# Patient Record
(demographics unavailable — no encounter records)

---

## 2024-12-31 NOTE — HISTORY OF PRESENT ILLNESS
[Not Applicable] : Not applicable [FreeTextEntry1] : Patient is a 9y/o female, domiciled with mother, father, and 6y/o brother, currently enrolled at [employer] (in enrichment program for bright students), 5th grade general education, has been in outpatient therapy with different providers for the past 2 years due to anxiety, currently in outpatient treatment with new therapist since August/September of 2024, no self-injury or suicide attempts, no aggression/violence, no substance use, no legal issues, no CPS involvement, no trauma/abuse, no PMH, presenting today with mother who was self-referred for severe anxiety.  [facility] DO met with patient who was polite and forthcoming. She reports experiencing distressing anxiety, describing it as unpredictable. While she finds her current therapist helpful, she expresses frustration with some of the therapist's recommendations to her parents. She notes a positive experience with therapy overall. A recent visit to a puppy store, where she was upset by not getting a puppy, triggered feelings of being left out because her friends have new pets. Patient reports her desire for a puppy stems from past experiences with panic attacks, during which she feared appendicitis due to abdominal pain, later attributed to nerves and constipation. Patient reports having a puppy might help her cope with her anxiety. She reports being free from panic attacks for a little over a month. She described her first panic attack, triggered by concern for a friend's well-being, which resulted in shaking, vomiting, and stiff hands. She experiences anxiety related to health concerns, particularly when others exhibit symptoms she fears she might also develop. She experiences ruminating thoughts, though they have lessened, focusing on fears of illness and potential hospital visits. Patient reports she manages these thoughts by focusing on music from plays, reflecting her passion for acting. She reports doing well academically, enjoying writing, reading graphic novels, and writing movie scripts. She maintains focus in class, though occasional distractions arise from her thoughts. She reports a positive relationship with her family, except for some conflict with her brother, and feels safe at home. Her appetite is poor, with a limited diet excluding meat (due to her refusal to eat animals), most vegetables, and many fruits. She currently eats only white meat and tends to overeat preferred foods. Her goals include improving her acting skills, reducing conflict with her brother, and persevering with her writing projects. She denies suicidal ideation, though she reports a past desire to be a different person. Denies self-injurious behaviors.   [facility] DO obtained collateral information from patient's mother. She reports two years ago, patient experienced significant anticipatory anxiety and panic attacks related to attending [employer] camp, ultimately leading to her not attending. Last year, while generally managing anxiety related to illness and medical issues better, she experienced sleep disruptions, including night terrors and screaming, for several weeks after returning from a two-week sleepaway camp with her best friend. This required someone to sleep with her throughout the summer. While her sleep has returned to normal, her anxiety has increased this year, extending to school and social situations, with unknown triggers and no reported trauma history. She exhibits a lack of focus, and struggles to self-regulate. Recent panic attacks have included physical stomach symptoms and a fear of something being wrong with her, one incident requiring consideration of a hospital visit. A month ago, constipation led to a painful episode at [facility] involving screaming and a sleepless night. Despite initial excitement about a school enrichment program (LEAP), she struggled academically at the beginning of the year, particularly in math, and experienced significant anxiety about the program, wanting to discontinue it. Her performance has since improved, though her teacher describes her as distracted, unfocused, prone to mistakes, and frequently draws and illustrates in class. At home, she exhibits challenging behaviors, including screaming, crying, and hiding under the table when asked to do something. Socially, she appears to be regressing, experiencing difficulties with friends. She is described as a "deep feeling kid" with a "big personality." While she enjoys theater and participates in state-level competitions, she has recently exhibited pre-performance anxiety and reluctance to attend rehearsals, which is new according to patient's mom. Mom reports she is an extremely picky eater with food aversions, limiting her intake to approximately ten foods and restricting restaurant choices. Mom reports during panic attacks, she hyperventilates, rocks back and forth, and on one occasion, she hit herself against the walls. They are satisfied with her current therapist and wish to continue services. Suicidal ideation and self-injurious behaviors are denied. Mom denies acute safety concerns.  [FreeTextEntry2] : See HPI [FreeTextEntry3] : None

## 2024-12-31 NOTE — RISK ASSESSMENT
[No] : No [No known suicide factors] : No known suicide factors [Triggering events leading to humiliation, shame, and/or despair] : triggering events leading to humiliation, shame, and/or despair (e.g. loss of relationship, financial or health status) (real or anticipated) [Identifies reasons for living] : identifies reasons for living [Supportive social network of family or friends] : supportive social network of family or friends [Fear of death/actual act of killing self] : fear of death or the actual act of killing self [Engaged in work or school] : engaged in work or school [None in the patient's lifetime] : None in the patient's lifetime [None Known] : none known [No known risk factors] : No known risk factors [Residential stability] : residential stability [Engagement in treatment] : engagement in treatment [Yes] : yes [de-identified] : Patient does not have access to lethal means/weapons

## 2024-12-31 NOTE — HISTORY OF PRESENT ILLNESS
[Not Applicable] : Not applicable [FreeTextEntry1] : Patient is a 11y/o female, domiciled with mother, father, and 6y/o brother, currently enrolled at [employer] (in enrichment program for bright students), 5th grade general education, has been in outpatient therapy with different providers for the past 2 years due to anxiety, currently in outpatient treatment with new therapist since August/September of 2024, no self-injury or suicide attempts, no aggression/violence, no substance use, no legal issues, no CPS involvement, no trauma/abuse, no PMH, presenting today with mother who was self-referred for severe anxiety.  [facility] DO met with patient who was polite and forthcoming. She reports experiencing distressing anxiety, describing it as unpredictable. While she finds her current therapist helpful, she expresses frustration with some of the therapist's recommendations to her parents. She notes a positive experience with therapy overall. A recent visit to a puppy store, where she was upset by not getting a puppy, triggered feelings of being left out because her friends have new pets. Patient reports her desire for a puppy stems from past experiences with panic attacks, during which she feared appendicitis due to abdominal pain, later attributed to nerves and constipation. Patient reports having a puppy might help her cope with her anxiety. She reports being free from panic attacks for a little over a month. She described her first panic attack, triggered by concern for a friend's well-being, which resulted in shaking, vomiting, and stiff hands. She experiences anxiety related to health concerns, particularly when others exhibit symptoms she fears she might also develop. She experiences ruminating thoughts, though they have lessened, focusing on fears of illness and potential hospital visits. Patient reports she manages these thoughts by focusing on music from plays, reflecting her passion for acting. She reports doing well academically, enjoying writing, reading graphic novels, and writing movie scripts. She maintains focus in class, though occasional distractions arise from her thoughts. She reports a positive relationship with her family, except for some conflict with her brother, and feels safe at home. Her appetite is poor, with a limited diet excluding meat (due to her refusal to eat animals), most vegetables, and many fruits. She currently eats only white meat and tends to overeat preferred foods. Her goals include improving her acting skills, reducing conflict with her brother, and persevering with her writing projects. She denies suicidal ideation, though she reports a past desire to be a different person. Denies self-injurious behaviors.   [facility] DO obtained collateral information from patient's mother. She reports two years ago, patient experienced significant anticipatory anxiety and panic attacks related to attending [employer] camp, ultimately leading to her not attending. Last year, while generally managing anxiety related to illness and medical issues better, she experienced sleep disruptions, including night terrors and screaming, for several weeks after returning from a two-week sleepaway camp with her best friend. This required someone to sleep with her throughout the summer. While her sleep has returned to normal, her anxiety has increased this year, extending to school and social situations, with unknown triggers and no reported trauma history. She exhibits a lack of focus, and struggles to self-regulate. Recent panic attacks have included physical stomach symptoms and a fear of something being wrong with her, one incident requiring consideration of a hospital visit. A month ago, constipation led to a painful episode at [facility] involving screaming and a sleepless night. Despite initial excitement about a school enrichment program (LEAP), she struggled academically at the beginning of the year, particularly in math, and experienced significant anxiety about the program, wanting to discontinue it. Her performance has since improved, though her teacher describes her as distracted, unfocused, prone to mistakes, and frequently draws and illustrates in class. At home, she exhibits challenging behaviors, including screaming, crying, and hiding under the table when asked to do something. Socially, she appears to be regressing, experiencing difficulties with friends. She is described as a "deep feeling kid" with a "big personality." While she enjoys theater and participates in state-level competitions, she has recently exhibited pre-performance anxiety and reluctance to attend rehearsals, which is new according to patient's mom. Mom reports she is an extremely picky eater with food aversions, limiting her intake to approximately ten foods and restricting restaurant choices. Mom reports during panic attacks, she hyperventilates, rocks back and forth, and on one occasion, she hit herself against the walls. They are satisfied with her current therapist and wish to continue services. Suicidal ideation and self-injurious behaviors are denied. Mom denies acute safety concerns.  [FreeTextEntry2] : See HPI [FreeTextEntry3] : None

## 2024-12-31 NOTE — HISTORY OF PRESENT ILLNESS
[Not Applicable] : Not applicable [FreeTextEntry1] : Patient is a 9y/o female, domiciled with mother, father, and 6y/o brother, currently enrolled at [employer] (in enrichment program for bright students), 5th grade general education, has been in outpatient therapy with different providers for the past 2 years due to anxiety, currently in outpatient treatment with new therapist since August/September of 2024, no self-injury or suicide attempts, no aggression/violence, no substance use, no legal issues, no CPS involvement, no trauma/abuse, no PMH, presenting today with mother who was self-referred for severe anxiety.  [facility] DO met with patient who was polite and forthcoming. She reports experiencing distressing anxiety, describing it as unpredictable. While she finds her current therapist helpful, she expresses frustration with some of the therapist's recommendations to her parents. She notes a positive experience with therapy overall. A recent visit to a puppy store, where she was upset by not getting a puppy, triggered feelings of being left out because her friends have new pets. Patient reports her desire for a puppy stems from past experiences with panic attacks, during which she feared appendicitis due to abdominal pain, later attributed to nerves and constipation. Patient reports having a puppy might help her cope with her anxiety. She reports being free from panic attacks for a little over a month. She described her first panic attack, triggered by concern for a friend's well-being, which resulted in shaking, vomiting, and stiff hands. She experiences anxiety related to health concerns, particularly when others exhibit symptoms she fears she might also develop. She experiences ruminating thoughts, though they have lessened, focusing on fears of illness and potential hospital visits. Patient reports she manages these thoughts by focusing on music from plays, reflecting her passion for acting. She reports doing well academically, enjoying writing, reading graphic novels, and writing movie scripts. She maintains focus in class, though occasional distractions arise from her thoughts. She reports a positive relationship with her family, except for some conflict with her brother, and feels safe at home. Her appetite is poor, with a limited diet excluding meat (due to her refusal to eat animals), most vegetables, and many fruits. She currently eats only white meat and tends to overeat preferred foods. Her goals include improving her acting skills, reducing conflict with her brother, and persevering with her writing projects. She denies suicidal ideation, though she reports a past desire to be a different person. Denies self-injurious behaviors.   [facility] DO obtained collateral information from patient's mother. She reports two years ago, patient experienced significant anticipatory anxiety and panic attacks related to attending [employer] camp, ultimately leading to her not attending. Last year, while generally managing anxiety related to illness and medical issues better, she experienced sleep disruptions, including night terrors and screaming, for several weeks after returning from a two-week sleepaway camp with her best friend. This required someone to sleep with her throughout the summer. While her sleep has returned to normal, her anxiety has increased this year, extending to school and social situations, with unknown triggers and no reported trauma history. She exhibits a lack of focus, and struggles to self-regulate. Recent panic attacks have included physical stomach symptoms and a fear of something being wrong with her, one incident requiring consideration of a hospital visit. A month ago, constipation led to a painful episode at [facility] involving screaming and a sleepless night. Despite initial excitement about a school enrichment program (LEAP), she struggled academically at the beginning of the year, particularly in math, and experienced significant anxiety about the program, wanting to discontinue it. Her performance has since improved, though her teacher describes her as distracted, unfocused, prone to mistakes, and frequently draws and illustrates in class. At home, she exhibits challenging behaviors, including screaming, crying, and hiding under the table when asked to do something. Socially, she appears to be regressing, experiencing difficulties with friends. She is described as a "deep feeling kid" with a "big personality." While she enjoys theater and participates in state-level competitions, she has recently exhibited pre-performance anxiety and reluctance to attend rehearsals, which is new according to patient's mom. Mom reports she is an extremely picky eater with food aversions, limiting her intake to approximately ten foods and restricting restaurant choices. Mom reports during panic attacks, she hyperventilates, rocks back and forth, and on one occasion, she hit herself against the walls. They are satisfied with her current therapist and wish to continue services. Suicidal ideation and self-injurious behaviors are denied. Mom denies acute safety concerns.  [FreeTextEntry2] : See HPI [FreeTextEntry3] : None

## 2024-12-31 NOTE — RISK ASSESSMENT
[No] : No [No known suicide factors] : No known suicide factors [Triggering events leading to humiliation, shame, and/or despair] : triggering events leading to humiliation, shame, and/or despair (e.g. loss of relationship, financial or health status) (real or anticipated) [Identifies reasons for living] : identifies reasons for living [Supportive social network of family or friends] : supportive social network of family or friends [Fear of death/actual act of killing self] : fear of death or the actual act of killing self [Engaged in work or school] : engaged in work or school [None in the patient's lifetime] : None in the patient's lifetime [None Known] : none known [No known risk factors] : No known risk factors [Residential stability] : residential stability [Engagement in treatment] : engagement in treatment [Yes] : yes [de-identified] : Patient does not have access to lethal means/weapons

## 2024-12-31 NOTE — DISCUSSION/SUMMARY
[Low acute suicide risk] : Low acute suicide risk [No] : No [Not clinically indicated] : Safety Plan completed/updated (for individuals at risk): Not clinically indicated [FreeTextEntry1] : At present, patient has a low risk of harm to self.  Although patient has risk factors including history of anxiety, severe panic attacks, patient has significant protective factors including strong family/social support, domiciled, age, lack of prior self-harm, no suicide attempts, no substance use, no veronika, no psychosis, no CAH, no psychiatric hospitalization, current willingness to engage in treatment, future orientation with long & short term goals for the future, hopeful, help-seeking, engaged in school & activities, current denial of any SIIP or urges to self-harm, no reported hx of abuse/trauma, no aggression/violence, no access to guns/family is able to means restrict, no legal history.

## 2024-12-31 NOTE — REASON FOR VISIT
[Behavioral Health Urgent Care Assessment] : a behavioral health urgent care assessment [Patient] : patient [Self] : alone [Mother] : with mother [TextBox_17] : Severe anxiety

## 2024-12-31 NOTE — PLAN
[TextBox_9] : Patient will benefit from connecting to social skills group [TextBox_11] : Discussed potential benefit of pharmacotherapy, family notes that they can return to discuss [TextBox_13] : no acute safety concerns at this time [TextBox_26] : self-referred/did not sign consent

## 2024-12-31 NOTE — HISTORY OF PRESENT ILLNESS
[Not Applicable] : Not applicable [FreeTextEntry1] : Patient is a 11y/o female, domiciled with mother, father, and 8y/o brother, currently enrolled at [employer] (in enrichment program for bright students), 5th grade general education, has been in outpatient therapy with different providers for the past 2 years due to anxiety, currently in outpatient treatment with new therapist since August/September of 2024, no self-injury or suicide attempts, no aggression/violence, no substance use, no legal issues, no CPS involvement, no trauma/abuse, no PMH, presenting today with mother who was self-referred for severe anxiety.  [facility] DO met with patient who was polite and forthcoming. She reports experiencing distressing anxiety, describing it as unpredictable. While she finds her current therapist helpful, she expresses frustration with some of the therapist's recommendations to her parents. She notes a positive experience with therapy overall. A recent visit to a puppy store, where she was upset by not getting a puppy, triggered feelings of being left out because her friends have new pets. Patient reports her desire for a puppy stems from past experiences with panic attacks, during which she feared appendicitis due to abdominal pain, later attributed to nerves and constipation. Patient reports having a puppy might help her cope with her anxiety. She reports being free from panic attacks for a little over a month. She described her first panic attack, triggered by concern for a friend's well-being, which resulted in shaking, vomiting, and stiff hands. She experiences anxiety related to health concerns, particularly when others exhibit symptoms she fears she might also develop. She experiences ruminating thoughts, though they have lessened, focusing on fears of illness and potential hospital visits. Patient reports she manages these thoughts by focusing on music from plays, reflecting her passion for acting. She reports doing well academically, enjoying writing, reading graphic novels, and writing movie scripts. She maintains focus in class, though occasional distractions arise from her thoughts. She reports a positive relationship with her family, except for some conflict with her brother, and feels safe at home. Her appetite is poor, with a limited diet excluding meat (due to her refusal to eat animals), most vegetables, and many fruits. She currently eats only white meat and tends to overeat preferred foods. Her goals include improving her acting skills, reducing conflict with her brother, and persevering with her writing projects. She denies suicidal ideation, though she reports a past desire to be a different person. Denies self-injurious behaviors.   [facility] DO obtained collateral information from patient's mother. She reports two years ago, patient experienced significant anticipatory anxiety and panic attacks related to attending [employer] camp, ultimately leading to her not attending. Last year, while generally managing anxiety related to illness and medical issues better, she experienced sleep disruptions, including night terrors and screaming, for several weeks after returning from a two-week sleepaway camp with her best friend. This required someone to sleep with her throughout the summer. While her sleep has returned to normal, her anxiety has increased this year, extending to school and social situations, with unknown triggers and no reported trauma history. She exhibits a lack of focus, and struggles to self-regulate. Recent panic attacks have included physical stomach symptoms and a fear of something being wrong with her, one incident requiring consideration of a hospital visit. A month ago, constipation led to a painful episode at [facility] involving screaming and a sleepless night. Despite initial excitement about a school enrichment program ([employer]AP), she struggled academically at the beginning of the year, particularly in math, and experienced significant anxiety about the program, wanting to discontinue it. Her performance has since improved, though her teacher describes her as distracted, unfocused, prone to mistakes, and frequently draws and illustrates in class. At home, she exhibits challenging behaviors, including screaming, crying, and hiding under the table when asked to do something. Socially, she appears to be regressing, experiencing difficulties with friends. She is described as a "deep feeling kid" with a "big personality." While she enjoys theater and participates in state-level competitions, she has recently exhibited pre-performance anxiety and reluctance to attend rehearsals, which is new according to patient's mom. Mom reports she is an extremely picky eater with food aversions, limiting her intake to approximately ten foods and restricting restaurant choices. Mom reports during panic attacks, she hyperventilates, rocks back and forth, and on one occasion, she hit herself against the walls. They are satisfied with her current therapist and wish to continue services. Suicidal ideation and self-injurious behaviors are denied. Mom denies acute safety concerns.  [FreeTextEntry2] : See HPI [FreeTextEntry3] : None

## 2024-12-31 NOTE — RISK ASSESSMENT
[No] : No [No known suicide factors] : No known suicide factors [Triggering events leading to humiliation, shame, and/or despair] : triggering events leading to humiliation, shame, and/or despair (e.g. loss of relationship, financial or health status) (real or anticipated) [Identifies reasons for living] : identifies reasons for living [Supportive social network of family or friends] : supportive social network of family or friends [Fear of death/actual act of killing self] : fear of death or the actual act of killing self [Engaged in work or school] : engaged in work or school [None in the patient's lifetime] : None in the patient's lifetime [None Known] : none known [No known risk factors] : No known risk factors [Residential stability] : residential stability [Engagement in treatment] : engagement in treatment [Yes] : yes [de-identified] : Patient does not have access to lethal means/weapons

## 2024-12-31 NOTE — RISK ASSESSMENT
[No] : No [No known suicide factors] : No known suicide factors [Triggering events leading to humiliation, shame, and/or despair] : triggering events leading to humiliation, shame, and/or despair (e.g. loss of relationship, financial or health status) (real or anticipated) [Identifies reasons for living] : identifies reasons for living [Supportive social network of family or friends] : supportive social network of family or friends [Fear of death/actual act of killing self] : fear of death or the actual act of killing self [Engaged in work or school] : engaged in work or school [None in the patient's lifetime] : None in the patient's lifetime [None Known] : none known [No known risk factors] : No known risk factors [Residential stability] : residential stability [Engagement in treatment] : engagement in treatment [Yes] : yes [de-identified] : Patient does not have access to lethal means/weapons

## 2025-04-22 NOTE — PLAN
[Provision of National Suicide Prevention Lifeline 9-997-424-TALK (6856)] : Provision of national suicide prevention lifeline 9-961-094-talk (6118) [Education provided regarding environmental safety/ lethal means restriction] : Education provided regarding environmental safety/ lethal means restriction [Contact was Attempted] : no contact was attempted [TextBox_9] : patient referred to Peds Neuro, care coordination to follow up regarding appointment, continue w/ current therapist  [TextBox_11] : none [TextBox_13] : Patient is not an acute threat to self or others and does not warrant for an inpatient admission. Patient presents as help seeking, future oriented, motivated for outpatient therapy and was able to engage in safety planning. Patient & family are agreeable to discharge plan. Patient & patient's family instructed to go to the nearest ED or call 911 if symptoms worsen or if safety concerns arise. [TextBox_26] : school currently on break

## 2025-04-22 NOTE — HISTORY OF PRESENT ILLNESS
[Not Applicable] : Not applicable [FreeTextEntry1] : Patient is a 11y/o female, domiciled with mother, father, and 8y/o brother, currently enrolled at [employer] in enrichment program for bright/accelerated students, 5th grade general education, has been in outpatient therapy with different providers for the past 2 years due to anxiety, currently in outpatient treatment with new therapist since August/September of 2024, no self-injury or suicide attempts, no aggression/violence, no substance use, no legal issues, no CPS involvement, no trauma/abuse, no PMH, presenting today with mother who was self-referred for assessment of ADHD.  Pt seen remotely and presented calm and cooperative w/ appropriate affect, cheerful throughout the interview and fidgety at times. Reports her anxiety has improved since last visit and has been feeling pretty happy. Sometimes has "weird dreams" (scary dreams on clarification) which she then uses to make make-belief movies. Admits to getting upset sometimes when she is told "no" which results in her slamming doors or hitting her brother (when he bothers her). Denies attention or focus issues. Denied mood/psychotic/anxiety symptoms. Denied current or past SI/HI, plan or intent. Denied current urges to harm self or others. Denied current aggressive ideations. Future oriented and wants to become an actor.   [facility] met with patient's parents. The patient's parents are reporting the following the symptoms of inattention: having a short attention span and being easily distracted; making careless mistakes - for example, rushing schoolwork; appearing forgetful or losing things; being unable to stick to tasks that are tedious or time-consuming; appearing to be unable to listen to or carry out instructions, often getting bored and changing activity or task; having difficulty organizing tasks and trouble with time management. They are also reporting symptoms of hyperactivity and impulsiveness: being unable to sit still, especially in calm or quiet surroundings; fidgeting often; excessive physical movement; excessive talking; being unable to wait their turn; acting without thinking; calling out or interrupting conversations. Patient's parents report that symptoms are persistent in academic, social, and residential setting. Patient's parents reports symptoms have been present since 3-4 y.o. but have increased in severity over the last few years, increasingly significantly to the point of impacting patient's academic and social life in a negative way this past school year.  Patient's parents are reporting general anxiety symptoms from patient consisting of: persistent worrying or anxiety about a number of areas that are out of proportion to the impact of the events; over-thinking plans and solutions to all possible worst-case outcomes; perceiving situations and events as threatening, even when they aren't; difficulty handling uncertainty; indecisiveness and fear of making the wrong decision; inability to set aside or let go of a worry; inability to relax, feeling restless, and feeling on edge; and difficulty concentrating. Patient's parents report that symptoms were significant in September through November of 2024, but report decrease in intensity over the last few months.   PER 12/2024 APPT:  Patient is a 11y/o female, domiciled with mother, father, and 8y/o brother, currently enrolled [facility] [employer] (in enrichment program for bright students), 5th grade general education, has been in outpatient therapy with different providers for the past 2 years due to anxiety, currently in outpatient treatment with new therapist since August/September of 2024, no self-injury or suicide attempts, no aggression/violence, no substance use, no legal issues, no CPS involvement, no trauma/abuse, no PMH, presenting today with mother who was self-referred for severe anxiety.  [facility] DO met with patient who was polite and forthcoming. She reports experiencing distressing anxiety, describing it as unpredictable. While she finds her current therapist helpful, she expresses frustration with some of the therapist's recommendations to her parents. She notes a positive experience with therapy overall. A recent visit to a puppy store, where she was upset by not getting a puppy, triggered feelings of being left out because her friends have new pets. Patient reports her desire for a puppy stems from past experiences with panic attacks, during which she feared appendicitis due to abdominal pain, later attributed to nerves and constipation. Patient reports having a puppy might help her cope with her anxiety. She reports being free from panic attacks for a little over a month. She described her first panic attack, triggered by concern for a friend's well-being, which resulted in shaking, vomiting, and stiff hands. She experiences anxiety related to health concerns, particularly when others exhibit symptoms she fears she might also develop. She experiences ruminating thoughts, though they have lessened, focusing on fears of illness and potential hospital visits. Patient reports she manages these thoughts by focusing on music from plays, reflecting her passion for acting. She reports doing well academically, enjoying writing, reading graphic novels, and writing movie scripts. She maintains focus in class, though occasional distractions arise from her thoughts. She reports a positive relationship with her family, except for some conflict with her brother, and feels safe at home. Her appetite is poor, with a limited diet excluding meat (due to her refusal to eat animals), most vegetables, and many fruits. She currently eats only white meat and tends to overeat preferred foods. Her goals include improving her acting skills, reducing conflict with her brother, and persevering with her writing projects. She denies suicidal ideation, though she reports a past desire to be a different person. Denies self-injurious behaviors.   [facility] DO obtained collateral information from patient's mother. She reports two years ago, patient experienced significant anticipatory anxiety and panic attacks related to attending [employer], ultimately leading to her not attending. Last year, while generally managing anxiety related to illness and medical issues better, she experienced sleep disruptions, including night terrors and screaming, for several weeks after returning from a two-week sleepaway camp with her best friend. This required someone to sleep with her throughout the summer. While her sleep has returned to normal, her anxiety has increased this year, extending to school and social situations, with unknown triggers and no reported trauma history. She exhibits a lack of focus, and struggles to self-regulate. Recent panic attacks have included physical stomach symptoms and a fear of something being wrong with her, one incident requiring consideration of a hospital visit. A month ago, constipation led to a painful episode at [facility] involving screaming and a sleepless night. Despite initial excitement about a school enrichment program ([employer]), she struggled academically at the beginning of the year, particularly in math, and experienced significant anxiety about the program, wanting to discontinue it. Her performance has since improved, though her teacher describes her as distracted, unfocused, prone to mistakes, and frequently draws and illustrates in class. At home, she exhibits challenging behaviors, including screaming, crying, and hiding under the table when asked to do something. Socially, she appears to be regressing, experiencing difficulties with friends. She is described as a "deep feeling kid" with a "big personality." While she enjoys theater and participates in state-level competitions, she has recently exhibited pre-performance anxiety and reluctance to attend rehearsals, which is new according to patient's mom. Mom reports she is an extremely picky eater with food aversions, limiting her intake to approximately ten foods and restricting restaurant choices. Mom reports during panic attacks, she hyperventilates, rocks back and forth, and on one occasion, she hit herself against the walls. They are satisfied with her current therapist and wish to continue services. Suicidal ideation and self-injurious behaviors are denied. Mom denies acute safety concerns.  [FreeTextEntry2] : See HPI [FreeTextEntry3] : None

## 2025-04-22 NOTE — PLAN
[Provision of National Suicide Prevention Lifeline 9-053-216-TALK (4769)] : Provision of national suicide prevention lifeline 4-372-118-talk (8286) [Education provided regarding environmental safety/ lethal means restriction] : Education provided regarding environmental safety/ lethal means restriction [Contact was Attempted] : no contact was attempted [TextBox_9] : patient referred to Peds Neuro, care coordination to follow up regarding appointment, continue w/ current therapist  [TextBox_11] : none [TextBox_13] : Patient is not an acute threat to self or others and does not warrant for an inpatient admission. Patient presents as help seeking, future oriented, motivated for outpatient therapy and was able to engage in safety planning. Patient & family are agreeable to discharge plan. Patient & patient's family instructed to go to the nearest ED or call 911 if symptoms worsen or if safety concerns arise. [TextBox_26] : school currently on break

## 2025-04-22 NOTE — PHYSICAL EXAM
[TextBox_3] : seen remotely - none observed within view limits  [TextBox_15] : seen remotely - none observed within view limits  [Cooperative] : cooperative [Euthymic] : euthymic [Full] : full [Clear] : clear [Linear/Goal Directed] : linear/goal directed [Average] : average [WNL] : within normal limits

## 2025-04-22 NOTE — DISCUSSION/SUMMARY
[address] [facility]    [address] [place] [address]    Phone:  [phone]                                       After Visit Summary   3/23/2017    Caro Palmer    MRN: [id number]           After Visit Summary Signature Page     I have received my discharge instructions, and my questions have been answered. I have discussed any challenges I see with this plan with the nurse or doctor.    ..........................................................................................................................................  Patient/Patient Representative Signature      ..........................................................................................................................................  Patient Representative Print Name and Relationship to Patient    ..................................................               ................................................  Date                                            Time    ..........................................................................................................................................  Reviewed by Signature/Title    ...................................................              ..............................................  Date                                                            Time           [Low acute suicide risk] : Low acute suicide risk [No] : No [Not clinically indicated] : Safety Plan completed/updated (for individuals at risk): Not clinically indicated [FreeTextEntry1] : At present, patient has a low risk of harm to self.  Although patient has risk factors including history of anxiety, patient has significant protective factors including strong family/social support, domiciled, age, lack of prior self-harm, no suicide attempts, no substance use, no veronika, no psychosis, no CAH, no psychiatric hospitalization, current willingness to engage in treatment, participation in safety planning, future orientation with long & short term goals for the future, hopeful, help-seeking, engaged in school & activities, current denial of any SIIP or urges to self-harm, no reported hx of abuse/trauma, no aggression/violence, no access to guns/family is able to means restrict, no legal history.  PER 12/2024 NOTE: At present, patient has a low risk of harm to self.  Although patient has risk factors including history of anxiety, severe panic attacks, patient has significant protective factors including strong family/social support, domiciled, age, lack of prior self-harm, no suicide attempts, no substance use, no veronika, no psychosis, no CAH, no psychiatric hospitalization, current willingness to engage in treatment, future orientation with long & short term goals for the future, hopeful, help-seeking, engaged in school & activities, current denial of any SIIP or urges to self-harm, no reported hx of abuse/trauma, no aggression/violence, no access to guns/family is able to means restrict, no legal history.

## 2025-04-22 NOTE — DISCUSSION/SUMMARY
[Low acute suicide risk] : Low acute suicide risk [No] : No [Not clinically indicated] : Safety Plan completed/updated (for individuals at risk): Not clinically indicated [FreeTextEntry1] : At present, patient has a low risk of harm to self.  Although patient has risk factors including history of anxiety, patient has significant protective factors including strong family/social support, domiciled, age, lack of prior self-harm, no suicide attempts, no substance use, no veronika, no psychosis, no CAH, no psychiatric hospitalization, current willingness to engage in treatment, participation in safety planning, future orientation with long & short term goals for the future, hopeful, help-seeking, engaged in school & activities, current denial of any SIIP or urges to self-harm, no reported hx of abuse/trauma, no aggression/violence, no access to guns/family is able to means restrict, no legal history.  PER 12/2024 NOTE: At present, patient has a low risk of harm to self.  Although patient has risk factors including history of anxiety, severe panic attacks, patient has significant protective factors including strong family/social support, domiciled, age, lack of prior self-harm, no suicide attempts, no substance use, no veronika, no psychosis, no CAH, no psychiatric hospitalization, current willingness to engage in treatment, future orientation with long & short term goals for the future, hopeful, help-seeking, engaged in school & activities, current denial of any SIIP or urges to self-harm, no reported hx of abuse/trauma, no aggression/violence, no access to guns/family is able to means restrict, no legal history.

## 2025-04-22 NOTE — REASON FOR VISIT
[Behavioral Health Urgent Care Assessment] : a behavioral health urgent care assessment [Patient] : patient [Parents] : parents [Self] : alone [TextBox_17] : ADHD evaluation

## 2025-04-22 NOTE — HISTORY OF PRESENT ILLNESS
[Not Applicable] : Not applicable [FreeTextEntry1] : Patient is a 11y/o female, domiciled with mother, father, and 6y/o brother, currently enrolled at [employer] in enrichment program for bright/accelerated students, 5th grade general education, has been in outpatient therapy with different providers for the past 2 years due to anxiety, currently in outpatient treatment with new therapist since August/September of 2024, no self-injury or suicide attempts, no aggression/violence, no substance use, no legal issues, no CPS involvement, no trauma/abuse, no PMH, presenting today with mother who was self-referred for assessment of ADHD.  Pt seen remotely and presented calm and cooperative w/ appropriate affect, cheerful throughout the interview and fidgety at times. Reports her anxiety has improved since last visit and has been feeling pretty happy. Sometimes has "weird dreams" (scary dreams on clarification) which she then uses to make make-belief movies. Admits to getting upset sometimes when she is told "no" which results in her slamming doors or hitting her brother (when he bothers her). Denies attention or focus issues. Denied mood/psychotic/anxiety symptoms. Denied current or past SI/HI, plan or intent. Denied current urges to harm self or others. Denied current aggressive ideations. Future oriented and wants to become an actor.   [facility] met with patient's parents. The patient's parents are reporting the following the symptoms of inattention: having a short attention span and being easily distracted; making careless mistakes - for example, rushing schoolwork; appearing forgetful or losing things; being unable to stick to tasks that are tedious or time-consuming; appearing to be unable to listen to or carry out instructions, often getting bored and changing activity or task; having difficulty organizing tasks and trouble with time management. They are also reporting symptoms of hyperactivity and impulsiveness: being unable to sit still, especially in calm or quiet surroundings; fidgeting often; excessive physical movement; excessive talking; being unable to wait their turn; acting without thinking; calling out or interrupting conversations. Patient's parents report that symptoms are persistent in academic, social, and residential setting. Patient's parents reports symptoms have been present since 3-4 y.o. but have increased in severity over the last few years, increasingly significantly to the point of impacting patient's academic and social life in a negative way this past school year.  Patient's parents are reporting general anxiety symptoms from patient consisting of: persistent worrying or anxiety about a number of areas that are out of proportion to the impact of the events; over-thinking plans and solutions to all possible worst-case outcomes; perceiving situations and events as threatening, even when they aren't; difficulty handling uncertainty; indecisiveness and fear of making the wrong decision; inability to set aside or let go of a worry; inability to relax, feeling restless, and feeling on edge; and difficulty concentrating. Patient's parents report that symptoms were significant in September through November of 2024, but report decrease in intensity over the last few months.   PER 12/2024 APPT:  Patient is a 11y/o female, domiciled with mother, father, and 6y/o brother, currently enrolled at [employer] (in enrichment program for bright students), 5th grade general education, has been in outpatient therapy with different providers for the past 2 years due to anxiety, currently in outpatient treatment with new therapist since August/September of 2024, no self-injury or suicide attempts, no aggression/violence, no substance use, no legal issues, no CPS involvement, no trauma/abuse, no PMH, presenting today with mother who was self-referred for severe anxiety.  [facility] DO met with patient who was polite and forthcoming. She reports experiencing distressing anxiety, describing it as unpredictable. While she finds her current therapist helpful, she expresses frustration with some of the therapist's recommendations to her parents. She notes a positive experience with therapy overall. A recent visit to a puppy store, where she was upset by not getting a puppy, triggered feelings of being left out because her friends have new pets. Patient reports her desire for a puppy stems from past experiences with panic attacks, during which she feared appendicitis due to abdominal pain, later attributed to nerves and constipation. Patient reports having a puppy might help her cope with her anxiety. She reports being free from panic attacks for a little over a month. She described her first panic attack, triggered by concern for a friend's well-being, which resulted in shaking, vomiting, and stiff hands. She experiences anxiety related to health concerns, particularly when others exhibit symptoms she fears she might also develop. She experiences ruminating thoughts, though they have lessened, focusing on fears of illness and potential hospital visits. Patient reports she manages these thoughts by focusing on music from plays, reflecting her passion for acting. She reports doing well academically, enjoying writing, reading graphic novels, and writing movie scripts. She maintains focus in class, though occasional distractions arise from her thoughts. She reports a positive relationship with her family, except for some conflict with her brother, and feels safe at home. Her appetite is poor, with a limited diet excluding meat (due to her refusal to eat animals), most vegetables, and many fruits. She currently eats only white meat and tends to overeat preferred foods. Her goals include improving her acting skills, reducing conflict with her brother, and persevering with her writing projects. She denies suicidal ideation, though she reports a past desire to be a different person. Denies self-injurious behaviors.   [facility] DO obtained collateral information from patient's mother. She reports two years ago, patient experienced significant anticipatory anxiety and panic attacks related to attending [employer] camp, ultimately leading to her not attending. Last year, while generally managing anxiety related to illness and medical issues better, she experienced sleep disruptions, including night terrors and screaming, for several weeks after returning from a two-week sleepaway camp with her best friend. This required someone to sleep with her throughout the summer. While her sleep has returned to normal, her anxiety has increased this year, extending to school and social situations, with unknown triggers and no reported trauma history. She exhibits a lack of focus, and struggles to self-regulate. Recent panic attacks have included physical stomach symptoms and a fear of something being wrong with her, one incident requiring consideration of a hospital visit. A month ago, constipation led to a painful episode at [facility] involving screaming and a sleepless night. Despite initial excitement about a school enrichment program ([employer]), she struggled academically at the beginning of the year, particularly in math, and experienced significant anxiety about the program, wanting to discontinue it. Her performance has since improved, though her teacher describes her as distracted, unfocused, prone to mistakes, and frequently draws and illustrates in class. At home, she exhibits challenging behaviors, including screaming, crying, and hiding under the table when asked to do something. Socially, she appears to be regressing, experiencing difficulties with friends. She is described as a "deep feeling kid" with a "big personality." While she enjoys theater and participates in state-level competitions, she has recently exhibited pre-performance anxiety and reluctance to attend rehearsals, which is new according to patient's mom. Mom reports she is an extremely picky eater with food aversions, limiting her intake to approximately ten foods and restricting restaurant choices. Mom reports during panic attacks, she hyperventilates, rocks back and forth, and on one occasion, she hit herself against the walls. They are satisfied with her current therapist and wish to continue services. Suicidal ideation and self-injurious behaviors are denied. Mom denies acute safety concerns.  [FreeTextEntry2] : See HPI [FreeTextEntry3] : None

## 2025-04-22 NOTE — RISK ASSESSMENT
[Collateral Sources] : Collateral Sources [None in the patient's lifetime] : None in the patient's lifetime [None Known] : none known [No] : no [No known risk factors] : No known risk factors [Clinical Interview] : Clinical Interview [No known suicide factors] : No known suicide factors [None known] : None known [Identifies reasons for living] : identifies reasons for living [Supportive social network of family or friends] : supportive social network of family or friends [Engaged in work or school] : engaged in work or school [Residential stability] : residential stability [Relationship stability] : relationship stability [Sobriety] : sobriety